# Patient Record
Sex: MALE | Race: BLACK OR AFRICAN AMERICAN | NOT HISPANIC OR LATINO | URBAN - METROPOLITAN AREA
[De-identification: names, ages, dates, MRNs, and addresses within clinical notes are randomized per-mention and may not be internally consistent; named-entity substitution may affect disease eponyms.]

---

## 2024-06-04 ENCOUNTER — EMERGENCY (EMERGENCY)
Facility: HOSPITAL | Age: 44
LOS: 1 days | Discharge: HOME | End: 2024-06-04
Attending: EMERGENCY MEDICINE | Admitting: EMERGENCY MEDICINE
Payer: SELF-PAY

## 2024-06-04 VITALS
SYSTOLIC BLOOD PRESSURE: 101 MMHG | RESPIRATION RATE: 17 BRPM | TEMPERATURE: 98 F | OXYGEN SATURATION: 97 % | HEART RATE: 87 BPM | DIASTOLIC BLOOD PRESSURE: 71 MMHG

## 2024-06-04 VITALS
HEART RATE: 90 BPM | OXYGEN SATURATION: 97 % | TEMPERATURE: 98 F | RESPIRATION RATE: 16 BRPM | DIASTOLIC BLOOD PRESSURE: 68 MMHG | SYSTOLIC BLOOD PRESSURE: 106 MMHG

## 2024-06-04 PROCEDURE — 99283 EMERGENCY DEPT VISIT LOW MDM: CPT

## 2024-06-04 RX ORDER — IBUPROFEN 200 MG
600 TABLET ORAL ONCE
Refills: 0 | Status: COMPLETED | OUTPATIENT
Start: 2024-06-04 | End: 2024-06-04

## 2024-06-04 RX ADMIN — Medication 100 MILLIGRAM(S): at 17:49

## 2024-06-04 RX ADMIN — Medication 600 MILLIGRAM(S): at 17:49

## 2024-06-04 NOTE — ED PROVIDER NOTE - OBJECTIVE STATEMENT
The patient is a 44-year-old male who presents to the emergency room requesting food and something to drink and also for evaluation of his lower extremities. The patient was placed in chair K for evaluation.  The patient initially appeared not to be able to speak - he was only mouthing words and was only willing to write down his reasons for coming to the emergency room on a yellow notepad and pen that was provided to him.  However during the interview when the patient was encouraged to speak the patient then spoke and stated that he can speak.      the pateint denies ivda   The patient is undomiciled.  The patient states that his leg symptoms which include excoriated individual marks on his lower extremities has been going on for many months and he is concerned that they have become infected.      Patient denies fevers and chills he denies erythema swelling or increased pain to the affected areas    Patient denies history of HIV syphilis or cancer.    The patient denies the following symptoms:  headache, dizziness/lightheadedness, neck pain/neck stiffness, numbness/tingling, photophobia, change in vision/hearing/gait/mental status/speech,difficulty swallowing, focal weakness,  fever, chills, chest/neck/jaw/arm or back pain, palpitations, shortness of breath, diaphoresis, swelling to arm and/or legs, N/V/D, abdominal pain, abdominal distention, back pain, flank pain,

## 2024-06-04 NOTE — ED ADULT TRIAGE NOTE - CHIEF COMPLAINT QUOTE
BIBEMS from street. EMS states they were flagged down due to scabs on b/l legs, "dehydration", headache on and off x months, sore throat x 6months. Ambulated for ems on scene.

## 2024-06-04 NOTE — ED PROVIDER NOTE - NSFOLLOWUPINSTRUCTIONS_ED_ALL_ED_FT
1. stay well hydrated  2. take all medications as directed without fail  3. follow up with your primary care doctor in 1 -2 days  4. return to ER if your symptoms worsen or for any other concern    Follow up with your primary care doctor or clinics listed below if you do not have a doctor  [address] [address]  To make an appointment, call [phone]  [facility], [place], operated by [facility]  Address: [address] [place] [id number]  Appointment Center: [phone] ([phone])

## 2024-06-04 NOTE — ED PROVIDER NOTE - PATIENT PORTAL LINK FT
You can access the FollowMyHealth Patient Portal offered by [facility] by registering at the following website: [email or web address]. By joining [employer]’s FollowMyHealth portal, you will also be able to view your health information using other applications (apps) compatible with our system.

## 2024-06-04 NOTE — ED ADULT NURSE NOTE - OBJECTIVE STATEMENT
Pt presents to ED with multiple medical complaints. As per EMS, pt approached ambulance with c/o headache, dehydration, and scabs to b/l lower legs. During assessment, pt is alert and awake but nonverbal. Pt is communicating at this time with notepad and pen. Upon questioning, pt reports having pain to b/l legs and states having scabbing of lower legs for past 2 weeks. Denies fever/chills, dizziness, LOC, trauma to lower leg, respiratory symptoms. Pt appears to be poor historian and is unable to recall PMH or prescribed medications. Pt presents to ED with multiple medical complaints. As per EMS, pt approached ambulance with c/o headache, dehydration, and scabs to b/l lower legs. During assessment, pt is alert and awake but refusing to speak. Pt is communicating at this time with notepad and pen. Upon questioning, pt reports having pain to b/l legs and states having scabbing of lower legs for past 2 weeks. Denies fever/chills, dizziness, LOC, trauma to lower leg, respiratory symptoms. Pt appears to be poor historian and is unable to recall PMH or prescribed medications.

## 2024-06-04 NOTE — ED PROVIDER NOTE - SKIN, MLM
Skin normal color for race, warm, dry and intact. multiple excorations to lower extremities with varied states of scab/healing. several of the markings are tender and erythematous without fluctuance or discharge or foul odor. no insects noted on body .

## 2024-06-04 NOTE — ED PROVIDER NOTE - CLINICAL SUMMARY MEDICAL DECISION MAKING FREE TEXT BOX
The patient is a 44-year-old male who presents to the emergency room requesting food and something to drink and also for evaluation of his lower extremities. The patient was placed in chair K for evaluation.  The patient initially appeared not to be able to speak - he was only mouthing words and was only willing to write down his reasons for coming to the emergency room on a yellow notepad and pen that was provided to him.  However during the interview when the patient was encouraged to speak the patient then spoke and stated that he can speak.    pt given food/drink    rx of doxy for potential cellulitis of healing wound sites.     ED evaluation and management discussed with the patient and family (if available) in detail.  Close PMD follow up encouraged.  Strict ED return instructions discussed in detail and patient given the opportunity to ask any questions about their discharge diagnosis and instructions. Patient verbalized understanding.

## 2024-06-07 DIAGNOSIS — L03.116 CELLULITIS OF LEFT LOWER LIMB: ICD-10-CM

## 2024-06-07 DIAGNOSIS — Z59.00 HOMELESSNESS UNSPECIFIED: ICD-10-CM

## 2024-06-07 DIAGNOSIS — L03.115 CELLULITIS OF RIGHT LOWER LIMB: ICD-10-CM

## 2024-06-07 DIAGNOSIS — F42.4 EXCORIATION (SKIN-PICKING) DISORDER: ICD-10-CM

## 2024-06-07 SDOH — ECONOMIC STABILITY - HOUSING INSECURITY: HOMELESSNESS UNSPECIFIED: Z59.00

## 2024-10-16 NOTE — ED PROVIDER NOTE - CPE EDP GASTRO NORM
Cam Hatfield,  Today I spoke with Angel and Reed (dtr) about Angel's Eliquis. Angel has been taking Eliquis for 1 week now, he states he is tolerating the medication well, reports adherence, no adverse effects, and denies s/s of bleeding. Reed reports Angel had a minor fall onto his side- denies hitting his head and states he has no bruising. Plan to continue on Eliquis 2.5mg twice daily and follow up in 3 months. Thank you! normal...